# Patient Record
Sex: MALE | Race: OTHER | HISPANIC OR LATINO
[De-identification: names, ages, dates, MRNs, and addresses within clinical notes are randomized per-mention and may not be internally consistent; named-entity substitution may affect disease eponyms.]

---

## 2019-01-29 ENCOUNTER — FORM ENCOUNTER (OUTPATIENT)
Age: 49
End: 2019-01-29

## 2019-01-30 ENCOUNTER — APPOINTMENT (OUTPATIENT)
Dept: OTHER | Facility: CLINIC | Age: 49
End: 2019-01-30
Payer: COMMERCIAL

## 2019-01-30 VITALS
DIASTOLIC BLOOD PRESSURE: 78 MMHG | RESPIRATION RATE: 16 BRPM | OXYGEN SATURATION: 99 % | BODY MASS INDEX: 30.65 KG/M2 | WEIGHT: 173 LBS | SYSTOLIC BLOOD PRESSURE: 123 MMHG | HEART RATE: 89 BPM | HEIGHT: 63 IN

## 2019-01-30 PROCEDURE — 96150: CPT

## 2019-01-30 PROCEDURE — 99396 PREV VISIT EST AGE 40-64: CPT | Mod: 25

## 2019-01-30 PROCEDURE — 94010 BREATHING CAPACITY TEST: CPT

## 2019-01-30 NOTE — DISCUSSION/SUMMARY
[Patient seen for WTC Monitoring ___] : Patient was seen for WTC monitoring [unfilled] [Please See Note in Chart and Documentation in Trial DB] : Please see note in chart and documentation in Trial DB. [FreeTextEntry3] : s. last seen in 2014. patient reports difficulty breathing especially when climbing stairs more than 1 flight. unable to walk even on flat ground for longer distances due to developing tiredness (more than 3-4 blocks) with some difficulty breathing. his symptoms have been worsening for several years. he reports occasional wheezing, difficulty breathing at night, waking up with wheezes and chest tightness in the morning upon waking up that lasts for some 2-3 hours. reports frequent, almost daily, cough, productive of more than 2 oz of sputum per day. no blood in the sputum. he has not seen an md for this. reports difficulty breathing from his right side. reports some heartburn almost on a daily basis, since 2002 as per report. reports snoring and excessive daytime sleepiness. reports fresh blood in the stools on occasions. has noticed loss of weight. \par his breathing problems appeared after his working at the [employer] site, patient states he never saw medical help for this condition. 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 reportedly has had spirometries for his asbestos tests throughout the years. he is currently working. has difficulty wearing his respirator, worsening with the passing of time. \par patient is diabetic on treatment for this with his pmd. reportedly never told his md about his breathing problems. \par long life non smoker. no history of asthma as a child. no history of allergies. no history of asthma in the family. \par o, overweight. tender at all sinuses and also at upper frontal and lateral zygomatic but less severe. turbinates are symmetrical, occluding the lumens in a good 50-60%, not puffy, no secretions. oropharynx is clear. no lymph nodes. lungs are clear to p/a, no wheezing, some decreased expiratory sounds bilateral. cor is rrr no murmurs. abdomen is soft, no masses, some tenderness epigastrium and right upper quadrant with no rebound. no edema. surgical scar lower back. \par nemesio today: fvc 3.31, 85%; fev1 2.26, 74%; ratio 68, 86%; flow volume slightly concave. loss of 226 ml/yr fev1 since 2014 (fev1 3.39 in 2014)\par a. sinusitis and gerd most probably related to wtc exposure. breathing problems more difficult to fully clarify as pt does not state a clear date when these symptoms started but is clear in stating that he did not have them prior to his working at the [employer]. pt works in the asbestos removal trade which could account for his respiratory issues as well. he may also has dina. \par p. will do chest ct instead of cxr to clarify respiratory issues. will refer for colonoscopy as well. will certify once ct result is done. once certified will request sleep study. rtc in some 2 mo to review results and proceed with certifications.

## 2019-01-30 NOTE — HEALTH RISK ASSESSMENT
[Patient reported colonoscopy was normal] : Patient reported colonoscopy was normal [ColonoscopyDate] : 1/2009

## 2019-01-31 LAB
ALBUMIN SERPL ELPH-MCNC: 4.5 G/DL
ALP BLD-CCNC: 80 U/L
ALT SERPL-CCNC: 32 U/L
ANION GAP SERPL CALC-SCNC: 13 MMOL/L
APPEARANCE: CLEAR
AST SERPL-CCNC: 24 U/L
BASOPHILS # BLD AUTO: 0.01 K/UL
BASOPHILS NFR BLD AUTO: 0.2 %
BILIRUB SERPL-MCNC: 0.3 MG/DL
BILIRUBIN URINE: NEGATIVE
BLOOD URINE: NEGATIVE
BUN SERPL-MCNC: 15 MG/DL
CALCIUM SERPL-MCNC: 9.6 MG/DL
CHLORIDE SERPL-SCNC: 98 MMOL/L
CHOLEST SERPL-MCNC: 233 MG/DL
CHOLEST/HDLC SERPL: 4.8 RATIO
CO2 SERPL-SCNC: 27 MMOL/L
COLOR: YELLOW
CREAT SERPL-MCNC: 0.7 MG/DL
EOSINOPHIL # BLD AUTO: 0.24 K/UL
EOSINOPHIL NFR BLD AUTO: 4.7 %
GLUCOSE QUALITATIVE U: 250 MG/DL
GLUCOSE SERPL-MCNC: 249 MG/DL
HCT VFR BLD CALC: 45.7 %
HDLC SERPL-MCNC: 49 MG/DL
HGB BLD-MCNC: 14.9 G/DL
IMM GRANULOCYTES NFR BLD AUTO: 0.2 %
KETONES URINE: NEGATIVE
LDLC SERPL CALC-MCNC: 127 MG/DL
LEUKOCYTE ESTERASE URINE: NEGATIVE
LYMPHOCYTES # BLD AUTO: 1.92 K/UL
LYMPHOCYTES NFR BLD AUTO: 37.6 %
MAN DIFF?: NORMAL
MCHC RBC-ENTMCNC: 29.3 PG
MCHC RBC-ENTMCNC: 32.6 GM/DL
MCV RBC AUTO: 89.8 FL
MONOCYTES # BLD AUTO: 0.41 K/UL
MONOCYTES NFR BLD AUTO: 8 %
NEUTROPHILS # BLD AUTO: 2.51 K/UL
NEUTROPHILS NFR BLD AUTO: 49.3 %
NITRITE URINE: NEGATIVE
PH URINE: 5.5
PLATELET # BLD AUTO: 263 K/UL
POTASSIUM SERPL-SCNC: 4.3 MMOL/L
PROT SERPL-MCNC: 7.3 G/DL
PROTEIN URINE: NEGATIVE MG/DL
RBC # BLD: 5.09 M/UL
RBC # FLD: 12.9 %
SODIUM SERPL-SCNC: 138 MMOL/L
SPECIFIC GRAVITY URINE: 1.02
TRIGL SERPL-MCNC: 285 MG/DL
UROBILINOGEN URINE: NEGATIVE MG/DL
WBC # FLD AUTO: 5.1 K/UL

## 2019-04-11 ENCOUNTER — APPOINTMENT (OUTPATIENT)
Dept: GASTROENTEROLOGY | Facility: CLINIC | Age: 49
End: 2019-04-11

## 2019-04-24 ENCOUNTER — APPOINTMENT (OUTPATIENT)
Dept: OTHER | Facility: CLINIC | Age: 49
End: 2019-04-24

## 2020-01-08 ENCOUNTER — LABORATORY RESULT (OUTPATIENT)
Age: 50
End: 2020-01-08

## 2020-01-08 ENCOUNTER — APPOINTMENT (OUTPATIENT)
Dept: OTHER | Facility: CLINIC | Age: 50
End: 2020-01-08
Payer: COMMERCIAL

## 2020-01-08 VITALS
BODY MASS INDEX: 31.01 KG/M2 | WEIGHT: 175 LBS | TEMPERATURE: 98.8 F | HEART RATE: 91 BPM | HEIGHT: 63 IN | DIASTOLIC BLOOD PRESSURE: 63 MMHG | OXYGEN SATURATION: 96 % | SYSTOLIC BLOOD PRESSURE: 97 MMHG | RESPIRATION RATE: 15 BRPM

## 2020-01-08 PROCEDURE — 99214 OFFICE O/P EST MOD 30 MIN: CPT | Mod: 25

## 2020-01-08 PROCEDURE — 99396 PREV VISIT EST AGE 40-64: CPT | Mod: 25

## 2020-01-08 PROCEDURE — 94010 BREATHING CAPACITY TEST: CPT

## 2020-01-08 RX ORDER — CLINDAMYCIN PHOSPHATE 1 G/10ML
1 GEL TOPICAL
Qty: 60 | Refills: 0 | Status: COMPLETED | COMMUNITY
Start: 2019-07-22

## 2020-01-08 RX ORDER — INSULIN GLARGINE 100 [IU]/ML
100 INJECTION, SOLUTION SUBCUTANEOUS
Qty: 15 | Refills: 0 | Status: COMPLETED | COMMUNITY
Start: 2019-05-15

## 2020-01-08 RX ORDER — LANCETS 33 GAUGE
EACH MISCELLANEOUS
Qty: 200 | Refills: 0 | Status: ACTIVE | COMMUNITY
Start: 2019-05-15

## 2020-01-08 RX ORDER — BLOOD SUGAR DIAGNOSTIC
STRIP MISCELLANEOUS
Qty: 200 | Refills: 0 | Status: COMPLETED | COMMUNITY
Start: 2019-05-15

## 2020-01-08 RX ORDER — DOXYCYCLINE 100 MG/1
100 CAPSULE ORAL
Qty: 14 | Refills: 0 | Status: COMPLETED | COMMUNITY
Start: 2019-07-08

## 2020-01-08 RX ORDER — SIMVASTATIN 20 MG/1
20 TABLET, FILM COATED ORAL
Qty: 90 | Refills: 0 | Status: ACTIVE | COMMUNITY
Start: 2019-10-03

## 2020-01-08 RX ORDER — RAMIPRIL 2.5 MG/1
2.5 CAPSULE ORAL
Qty: 90 | Refills: 0 | Status: ACTIVE | COMMUNITY
Start: 2019-05-15

## 2020-01-08 RX ORDER — INSULIN LISPRO 200 [IU]/ML
200 INJECTION, SOLUTION SUBCUTANEOUS
Qty: 6 | Refills: 0 | Status: COMPLETED | COMMUNITY
Start: 2019-05-15

## 2020-01-08 RX ORDER — TRIAMCINOLONE ACETONIDE 1 MG/G
0.1 CREAM TOPICAL
Qty: 80 | Refills: 0 | Status: COMPLETED | COMMUNITY
Start: 2019-07-22

## 2020-01-08 RX ORDER — SITAGLIPTIN AND METFORMIN HYDROCHLORIDE 50; 1000 MG/1; MG/1
50-1000 TABLET, FILM COATED ORAL
Qty: 180 | Refills: 0 | Status: COMPLETED | COMMUNITY
Start: 2019-10-03

## 2020-01-08 NOTE — ASSESSMENT
[FreeTextEntry1] : siro today: fvc 3.30, 79%; fev1 2.20, 68%; ratio 67, 85%; flow volume is restrictive overall, fet 5 sec. loss of 238ml/yr fev1 since 2014 (fev1 3.39)\par a. symptomatic due to lack of treatment. conditons are chronic and permanent.\par p. will start patient on anoro one puff a day, and famotidine prn for gerd. will evaluate response and decide sleep study based on response. will get chest ct follow up for enlarged lymph nodes. rtc in 4 mo.

## 2020-01-08 NOTE — HISTORY OF PRESENT ILLNESS
[FreeTextEntry1] : certified for sinusitis, gerd and chronic resp condition\par s. last seen a year ago.\par patient reports phlegm when coughing, thick, white, no blood. difficulty breathing. continues with nasal stufiness especially rigth side. no blood. his gerd continues. reports excessive daytime sleepiness and tiredness. \par patient has dm on insulin and metformin, ramipril and simvastatin. \par he continues working. has difficulty tolerating his respiratory protective equipment. \par \par

## 2020-01-08 NOTE — PHYSICAL EXAM
[General Appearance - Alert] : alert [General Appearance - In No Acute Distress] : in no acute distress [General Appearance - Well Nourished] : well nourished [General Appearance - Well Developed] : well developed [Sclera] : the sclera and conjunctiva were normal [Extraocular Movements] : extraocular movements were intact [PERRL With Normal Accommodation] : pupils were equal in size, round, and reactive to light [Outer Ear] : the ears and nose were normal in appearance [Neck Appearance] : the appearance of the neck was normal [Neck Cervical Mass (___cm)] : no neck mass was observed [Jugular Venous Distention Increased] : there was no jugular-venous distention [Thyroid Diffuse Enlargement] : the thyroid was not enlarged [Exaggerated Use Of Accessory Muscles For Inspiration] : no accessory muscle use [Respiration, Rhythm And Depth] : normal respiratory rhythm and effort [Lungs Percussion] : the lungs were normal to percussion [FreeTextEntry1] : expiratlory wheezes atneriorly left upper lobe when lying down [Apical Impulse] : the apical impulse was normal [Heart Rate And Rhythm] : heart rate was normal and rhythm regular [Heart Sounds] : normal S1 and S2 [Heart Sounds Gallop] : no gallops [Murmurs] : no murmurs [Edema] : there was no peripheral edema [Bowel Sounds] : normal bowel sounds [Abdomen Soft] : soft [Abdomen Tenderness] : non-tender [] : no hepato-splenomegaly [Abdomen Mass (___ Cm)] : no abdominal mass palpated [Cervical Lymph Nodes Enlarged Posterior Bilaterally] : posterior cervical [Cervical Lymph Nodes Enlarged Anterior Bilaterally] : anterior cervical [Supraclavicular Lymph Nodes Enlarged Bilaterally] : supraclavicular [Axillary Lymph Nodes Enlarged Bilaterally] : axillary

## 2020-01-08 NOTE — DISCUSSION/SUMMARY
[Patient seen for WTC Monitoring ___] : Patient was seen for WTC monitoring [unfilled] [Please See Note in Chart and Documentation in Trial DB] : Please see note in chart and documentation in Trial DB. [FreeTextEntry3] : certified for sinusitis, gerd and chronic resp condition\par s. last seen a year ago.\par patient reports phlegm when coughing, thick, white, no blood. difficulty breathing. continues with nasal stufiness especially rigth side. no blood. his gerd continues. reports excessive daytime sleepiness and tiredness. \par patient has dm on insulin and metformin, ramipril and simvastatin. \par he continues working. has difficulty tolerating his respiratory protective equipment. \par Constitutional: alert, in no acute distress, well nourished and well developed . overweight. \par Eyes: the sclera and conjunctiva were normal, pupils were equal in size, round, and reactive to light and extraocular movements were intact. \par ENT: the ears and nose were normal in appearance. Puffty turbinates, symmetrically occluding the lumens in a good 60% or so, myucosa reddish, no tender at sinues. oropharynx overall clear. \par Neck: the appearance of the neck was normal, the neck was supple, no neck mass was observed and the thyroid was not enlarged . there was no jugular-venous distention. \par Pulmonary: no respiratory distress, normal respiratory rhythm and effort and no accessory muscle use . the lungs were normal to percussion. expiratlory wheezes atneriorly left upper lobe when lying down. \par Heart: the apical impulse was normal, heart rate was normal and rhythm regular, normal S1 and S2, no gallops and no murmurs. \par Vascular:. there was no peripheral edema. \par Abdomen: normal bowel sounds, soft, non-tender, no hepato-splenomegaly and no abdominal mass palpated. \par Lymphatics: The posterior cervical, anterior cervical, supraclavicular and axillary nodes were non-tender and normal size.\par a and p. documentation compelted.

## 2020-01-08 NOTE — PHYSICAL EXAM
[General Appearance - Alert] : alert [General Appearance - In No Acute Distress] : in no acute distress [General Appearance - Well Nourished] : well nourished [General Appearance - Well Developed] : well developed [Sclera] : the sclera and conjunctiva were normal [PERRL With Normal Accommodation] : pupils were equal in size, round, and reactive to light [Extraocular Movements] : extraocular movements were intact [Outer Ear] : the ears and nose were normal in appearance [Neck Appearance] : the appearance of the neck was normal [Neck Cervical Mass (___cm)] : no neck mass was observed [Jugular Venous Distention Increased] : there was no jugular-venous distention [Thyroid Diffuse Enlargement] : the thyroid was not enlarged [Respiration, Rhythm And Depth] : normal respiratory rhythm and effort [Exaggerated Use Of Accessory Muscles For Inspiration] : no accessory muscle use [Lungs Percussion] : the lungs were normal to percussion [FreeTextEntry1] : expiratlory wheezes atneriorly left upper lobe when lying down [Apical Impulse] : the apical impulse was normal [Heart Rate And Rhythm] : heart rate was normal and rhythm regular [Heart Sounds] : normal S1 and S2 [Murmurs] : no murmurs [Heart Sounds Gallop] : no gallops [Edema] : there was no peripheral edema [Bowel Sounds] : normal bowel sounds [Abdomen Tenderness] : non-tender [Abdomen Soft] : soft [Abdomen Mass (___ Cm)] : no abdominal mass palpated [] : no hepato-splenomegaly [Cervical Lymph Nodes Enlarged Posterior Bilaterally] : posterior cervical [Cervical Lymph Nodes Enlarged Anterior Bilaterally] : anterior cervical [Supraclavicular Lymph Nodes Enlarged Bilaterally] : supraclavicular [Axillary Lymph Nodes Enlarged Bilaterally] : axillary

## 2020-01-09 LAB
ALBUMIN SERPL ELPH-MCNC: 4.3 G/DL
ALP BLD-CCNC: 177 U/L
ALT SERPL-CCNC: 36 U/L
ANION GAP SERPL CALC-SCNC: 16 MMOL/L
APPEARANCE: ABNORMAL
AST SERPL-CCNC: 21 U/L
BASOPHILS # BLD AUTO: 0.03 K/UL
BASOPHILS NFR BLD AUTO: 0.4 %
BILIRUB SERPL-MCNC: 0.3 MG/DL
BILIRUBIN URINE: NEGATIVE
BLOOD URINE: ABNORMAL
BUN SERPL-MCNC: 18 MG/DL
CALCIUM SERPL-MCNC: 9.6 MG/DL
CHLORIDE SERPL-SCNC: 97 MMOL/L
CHOLEST SERPL-MCNC: 147 MG/DL
CHOLEST/HDLC SERPL: 3.3 RATIO
CO2 SERPL-SCNC: 26 MMOL/L
COLOR: YELLOW
CREAT SERPL-MCNC: 0.98 MG/DL
EOSINOPHIL # BLD AUTO: 0.15 K/UL
EOSINOPHIL NFR BLD AUTO: 1.9 %
GLUCOSE QUALITATIVE U: NEGATIVE
GLUCOSE SERPL-MCNC: 176 MG/DL
HCT VFR BLD CALC: 41.1 %
HDLC SERPL-MCNC: 45 MG/DL
HGB BLD-MCNC: 12.7 G/DL
IMM GRANULOCYTES NFR BLD AUTO: 0.4 %
KETONES URINE: NEGATIVE
LDLC SERPL CALC-MCNC: 81 MG/DL
LEUKOCYTE ESTERASE URINE: ABNORMAL
LYMPHOCYTES # BLD AUTO: 1.85 K/UL
LYMPHOCYTES NFR BLD AUTO: 23.1 %
MAN DIFF?: NORMAL
MCHC RBC-ENTMCNC: 29.3 PG
MCHC RBC-ENTMCNC: 30.9 GM/DL
MCV RBC AUTO: 94.9 FL
MONOCYTES # BLD AUTO: 0.64 K/UL
MONOCYTES NFR BLD AUTO: 8 %
NEUTROPHILS # BLD AUTO: 5.31 K/UL
NEUTROPHILS NFR BLD AUTO: 66.2 %
NITRITE URINE: POSITIVE
PH URINE: 6
PLATELET # BLD AUTO: 414 K/UL
POTASSIUM SERPL-SCNC: 4.2 MMOL/L
PROT SERPL-MCNC: 7.8 G/DL
PROTEIN URINE: ABNORMAL
RBC # BLD: 4.33 M/UL
RBC # FLD: 13.2 %
SODIUM SERPL-SCNC: 139 MMOL/L
SPECIFIC GRAVITY URINE: 1.02
TRIGL SERPL-MCNC: 106 MG/DL
UROBILINOGEN URINE: NORMAL
WBC # FLD AUTO: 8.01 K/UL

## 2020-03-16 ENCOUNTER — APPOINTMENT (OUTPATIENT)
Dept: GASTROENTEROLOGY | Facility: CLINIC | Age: 50
End: 2020-03-16

## 2020-05-27 ENCOUNTER — APPOINTMENT (OUTPATIENT)
Dept: OTHER | Facility: CLINIC | Age: 50
End: 2020-05-27
Payer: COMMERCIAL

## 2020-05-27 ENCOUNTER — APPOINTMENT (OUTPATIENT)
Dept: OTHER | Facility: CLINIC | Age: 50
End: 2020-05-27

## 2020-05-27 PROCEDURE — 99441: CPT | Mod: 95

## 2020-05-27 NOTE — HISTORY OF PRESENT ILLNESS
[Medical Office: ([facility])___] : at the medical office located in  [Home] : at home, [unfilled] , at the time of the visit. [Verbal consent obtained from patient] : the patient, [unfilled] [FreeTextEntry1] : telephonic visit at the time of the covid 19 epidemic\par certified for sinusitis, gerd and chronic resp condition\par s. patient reports abdominal pain, anorexia, malaise and nausea for the past some 5 days. he reports no fever but states that is taking tylenol for pain after which he develops profuse sweating. he had constipation the first three days but now his intestinal habit is normal. patient has not been able to contact his pmd about this.\par patient continues with cough and sob. his symptoms improved somehow with anoro but this is now finished and he never picked up the refill, as the pharmacy charged him copayment. \par his gerd is stable.\par patient has dm on insulin and metformin, ramipril and simvastatin. \par he has been out of work\par o. on telephone patient sounded weak and in pain\par p. i urged the patient to attend an urgent  care as soon as possible. i explained that this does not seem to be covid 19 but that it could be osme type of intestinal problem that most probably will require furtehr testing and may require additoinal treatment. pt understood. as for his meds, will sign him into long term pharmacy and submit meds to this service. will follow up with him tomorrow about his urgent care visit. next visit will discuss sleep study. explained about ct results. reassurance and support.\par i spent some 9 min on the phone with pt.

## 2020-06-10 ENCOUNTER — LABORATORY RESULT (OUTPATIENT)
Age: 50
End: 2020-06-10

## 2020-06-10 ENCOUNTER — APPOINTMENT (OUTPATIENT)
Dept: GASTROENTEROLOGY | Facility: CLINIC | Age: 50
End: 2020-06-10
Payer: COMMERCIAL

## 2020-06-10 ENCOUNTER — APPOINTMENT (OUTPATIENT)
Dept: OTHER | Facility: CLINIC | Age: 50
End: 2020-06-10

## 2020-06-10 VITALS
BODY MASS INDEX: 31.36 KG/M2 | SYSTOLIC BLOOD PRESSURE: 117 MMHG | WEIGHT: 177 LBS | TEMPERATURE: 97.4 F | HEIGHT: 63 IN | HEART RATE: 90 BPM | DIASTOLIC BLOOD PRESSURE: 80 MMHG | OXYGEN SATURATION: 98 %

## 2020-06-10 DIAGNOSIS — Z11.59 ENCOUNTER FOR SCREENING FOR OTHER VIRAL DISEASES: ICD-10-CM

## 2020-06-10 DIAGNOSIS — R19.8 OTHER SPECIFIED SYMPTOMS AND SIGNS INVOLVING THE DIGESTIVE SYSTEM AND ABDOMEN: ICD-10-CM

## 2020-06-10 DIAGNOSIS — R07.89 OTHER CHEST PAIN: ICD-10-CM

## 2020-06-10 DIAGNOSIS — R11.2 NAUSEA WITH VOMITING, UNSPECIFIED: ICD-10-CM

## 2020-06-10 PROCEDURE — 99204 OFFICE O/P NEW MOD 45 MIN: CPT

## 2020-06-10 RX ORDER — POLYETHYLENE GLYCOL 3350, SODIUM CHLORIDE, SODIUM BICARBONATE AND POTASSIUM CHLORIDE WITH LEMON FLAVOR 420; 11.2; 5.72; 1.48 G/4L; G/4L; G/4L; G/4L
420 POWDER, FOR SOLUTION ORAL
Qty: 1 | Refills: 0 | Status: ACTIVE | COMMUNITY
Start: 2020-06-10 | End: 1900-01-01

## 2020-06-10 RX ORDER — PROCHLORPERAZINE MALEATE 10 MG/1
10 TABLET ORAL
Refills: 0 | Status: ACTIVE | COMMUNITY

## 2020-06-10 NOTE — HISTORY OF PRESENT ILLNESS
[None] : had no significant interval events [Yellow Skin Or Eyes (Jaundice)] : denies jaundice [Rectal Pain] : denies rectal pain [Heartburn] : heartburn [Nausea] : nausea [Diarrhea] : diarrhea [Vomiting] : vomiting [Abdominal Pain] : abdominal pain [Constipation] : constipation [GERD] : gastroesophageal reflux disease [Abdominal Swelling] : abdominal swelling [Wt Gain ___ Lbs] : no recent weight gain [Wt Loss ___ Lbs] : no recent weight loss [Hiatus Hernia] : no hiatus hernia [Peptic Ulcer Disease] : no peptic ulcer disease [Pancreatitis] : no pancreatitis [Cholelithiasis] : no cholelithiasis [Kidney Stone] : no kidney stone [Inflammatory Bowel Disease] : no inflammatory bowel disease [Irritable Bowel Syndrome] : no irritable bowel syndrome [Alcohol Abuse] : no alcohol abuse [Diverticulitis] : no diverticulitis [Malignancy] : no malignancy [Abdominal Surgery] : no abdominal surgery [Appendectomy] : no appendectomy [Cholecystectomy] : no cholecystectomy [de-identified] : The patient is a 50-year-old  male with past medical history significant for hypertension, hypercholesterolemia and diabetes mellitus who was referred to my office by Dr. Marty Robles for abdominal pain, dyspepsia, gastroesophageal reflux disease, atypical chest pain and nausea/vomiting. The patient also admits to having alternating diarrhea/constipation, change in bowel habits and change in caliber of stool. I was asked to render an opinion for consultation for the above complaints.   The patient states that he is feeling uncomfortable x 3 weeks. The patient denies any exposure to COVID 19 infection.  The patient admits to a recent viral illness that resolved spontaneously.   The patient complains of abdominal pain.  The patient describes the abdominal pain as a crampy, intermittent c diffuse abdominal discomfort that occasionally radiates to the back.  The abdominal pain is worse with stress.  The abdominal pain is worse with meals and improves with passing gas or having a bowel movement.  The abdominal pain is described as being mild to moderate in nature.  The abdominal pain occurs at night and in the morning.  The abdominal pain can occur at any time.   The abdominal pain has awakened the patient from sleep.  The abdominal pain is slightly relieved with certain medication such as pump inhibitors, H2 blockers and antacids.  The abdominal pain is associated with abdominal gas and bloating.  The patient complains of nausea and vomiting.  The patient complains of gastroesophageal reflux disease but denies any dysphagia. The gastroesophageal reflux disease is worse after meals and late at night and in the early morning. The gastroesophageal reflux disease is slightly improved with proton pump inhibitors, H2 blockers and antacids.  The patient complains of atypical chest pain, shortness of breath, cough and palpitations that resolved spontaneously.  He currently denies any atypical chest pain, shortness of breath or palpitations.  The chest pain is described as a pressure, intermittent substernal discomfort that occasionally radiates to the back.  The patient admits to occasional episodes of diaphoresis.  The chest pain is described as being 8 out of 10 in intensity.  The chest pain can occur at any time.  The chest pain is worse at night and early morning.  The chest pain is worse after meals and improves with passing gas.  The chest pain never awakened the patient from sleep.  The patient complains of alternating diarrhea/constipation.  The patient has 1 to 3 bowel movements a day.  The patient complains of a change in bowel habits.  The patient complains of a change in caliber of stool.   The diarrhea is described as soft to watery in nature.  The patient admits to having mucus discharge with the bowel movements.  The patient denies any bright red blood per rectum, melena or hematemesis.  The patient denies any rectal pain or rectal pruritus. The patient complains of anorexia but denies any weight loss.  He previously complained of fever and chills that resolved spontaneously.  He currently denies any fevers or chills.  The patient denies any jaundice or pruritus.  The patient complains of occasional lower back pain.  The patient admits to having a prior ?colonoscopy performed by another gastroenterologist over 10 years ago.  According to the patient, the colonoscopic findings were unknown to the patient.  The patient admits to a family history of GI problems.  The patient’s sister had a history of liver cancer.

## 2020-06-10 NOTE — ASSESSMENT
[FreeTextEntry1] : Abdominal Pain: The patient complains of abdominal pain. The patient is to avoid nonsteroidal anti-inflammatory drugs and aspirin. I recommend a trial of Pantoprazole 40 mg once a day for 3 months for the symptoms.  \par Dyspepsia: The patient complains of dyspeptic symptoms.  The patient was advised to abide by an anti-gas diet.  The patient was given a pamphlet for anti-gas.  The patient and I reviewed the anti-gas diet at length. The patient is to start on a trial of Phazyme one tablet 3 times a day p.r.n. abdominal pain and gas.\par GERD: The patient was advised to avoid late-night meals and dietary indiscretions.  The patient was advised to avoid fried and fatty foods.  The patient was advised to abide by an anti-GERD diet. The patient was given a pamphlet for anti-GERD.  The patient and I reviewed the anti-GERD diet at length. I recommend a trial of Pantoprazole 40 mg once a day x 3 months for the symptoms.\par Nausea/Vomiting: The patient complains of nausea/vomiting. If the symptoms of nausea/vomiting persists, the patient may require a trial of Zofran 4 mg twice a day. \par Alternating Diarrhea/Constipation: The patient complains of alternating diarrhea/constipation.  I recommend a low residue diet. The patient is to avoid fiber supplementation. The patient is to consider starting a trial of a probiotic such as Align once a day.   If the symptons persist, the patient may require sending stool studies for C+S, O+P x3, and C. difficile to assess for an infectious etiology of the diarrhea.  The symptoms are worse after meals.  The patient has a history of cholecystectomy.  I recommend a trial of cholestyramine one packet once a day for possible bile induced diarrhea. \par Atypical Chest Pain: The patient complains of atypical chest pain of unclear etiology.  The patient was advised to follow up with the PMD and cardiologist regarding evaluation for the atypical chest pain. The patient was told of possible etiologies such as cardiac, pulmonary, GI, musculoskeletal, stress and other causes for the atypical chest pain.  The patient agrees and will follow-up with the PMD and cardiologist. \par Upper Endoscopy and Colonoscopy: I recommend an upper endoscopy and colonoscopy to assess the symptoms.  The patient was told of the risks and benefits of the procedure.  The patient was told of the risks of perforation, emergency surgery, bleeding, infections and missed lesions.  The patient agreed and will schedule for the procedure. The patient can take the antihypertensive medication with a sip of water one hour prior to the procedure. The patient is to hold the diabetic medication the day before and the morning of the procedure. The patient is to be n.p.o. after midnight and bowel prep was given.  The patient is to return for the procedure. \par Blood Work: I recommend blood work to assess the patient's symptoms. I recommend a CBC, SMA 24, amylase, lipase, ESR, TFTs, ROMAN, rheumatoid factor, celiac sprue panel, IgA, profile for hepatitis A, B, C. ,iron, TIBC, ferritin level and lipid profile.  I also recommend obtaining the recent blood work performed by the patient's PMD.\par I recommend blood work to assess for COVID 19 IgG antibodies.\par \par \par \par \par \par \par \par

## 2020-06-10 NOTE — REVIEW OF SYSTEMS
[Feeling Tired] : feeling tired [Eyesight Problems] : eyesight problems [Nasal Discharge] : nasal discharge [Earache] : earache [Chest Pain] : chest pain [Sore Throat] : sore throat [Abdominal Pain] : abdominal pain [Palpitations] : palpitations [Shortness Of Breath] : shortness of breath [Diarrhea] : diarrhea [Constipation] : constipation [Nocturia] : nocturia [Heartburn] : heartburn [Joint Pain] : joint pain [Arthralgias] : arthralgias [Dry Skin] : dry skin [Dizziness] : dizziness [Depression] : depression [Anxiety] : anxiety [Negative] : Heme/Lymph [de-identified] : tremors, headaches

## 2020-06-15 DIAGNOSIS — Z01.818 ENCOUNTER FOR OTHER PREPROCEDURAL EXAMINATION: ICD-10-CM

## 2020-06-15 LAB
ALBUMIN SERPL ELPH-MCNC: 4 G/DL
ALP BLD-CCNC: 360 U/L
ALT SERPL-CCNC: 63 U/L
AMYLASE/CREAT SERPL: 78 U/L
ANION GAP SERPL CALC-SCNC: 18 MMOL/L
AST SERPL-CCNC: 41 U/L
BASOPHILS # BLD AUTO: 0.03 K/UL
BASOPHILS NFR BLD AUTO: 0.4 %
BILIRUB SERPL-MCNC: 0.2 MG/DL
BUN SERPL-MCNC: 20 MG/DL
CALCIUM SERPL-MCNC: 10.2 MG/DL
CHLORIDE SERPL-SCNC: 99 MMOL/L
CO2 SERPL-SCNC: 22 MMOL/L
CREAT SERPL-MCNC: 1.32 MG/DL
EOSINOPHIL # BLD AUTO: 0.12 K/UL
EOSINOPHIL NFR BLD AUTO: 1.8 %
ERYTHROCYTE [SEDIMENTATION RATE] IN BLOOD BY WESTERGREN METHOD: 120 MM/HR
FERRITIN SERPL-MCNC: 791 NG/ML
GGT SERPL-CCNC: 184 U/L
GLIADIN IGA SER QL: 5 UNITS
GLIADIN IGG SER QL: <5 UNITS
GLIADIN PEPTIDE IGA SER-ACNC: NEGATIVE
GLIADIN PEPTIDE IGG SER-ACNC: NEGATIVE
GLUCOSE SERPL-MCNC: 111 MG/DL
HBV CORE IGG+IGM SER QL: NONREACTIVE
HBV CORE IGM SER QL: NONREACTIVE
HBV E AB SER QL: NEGATIVE
HBV E AG SER QL: NEGATIVE
HBV SURFACE AB SER QL: ABNORMAL
HBV SURFACE AG SER QL: NONREACTIVE
HCT VFR BLD CALC: 37.5 %
HCV AB SER QL: NONREACTIVE
HCV S/CO RATIO: 0.17 S/CO
HEMOCCULT STL QL: NEGATIVE
HGB BLD-MCNC: 11.1 G/DL
IGA SER QL IEP: 469 MG/DL
IMM GRANULOCYTES NFR BLD AUTO: 0.3 %
IRON SATN MFR SERPL: 20 %
IRON SERPL-MCNC: 42 UG/DL
LPL SERPL-CCNC: 44 U/L
LYMPHOCYTES # BLD AUTO: 1.71 K/UL
LYMPHOCYTES NFR BLD AUTO: 25.4 %
MAN DIFF?: NORMAL
MCHC RBC-ENTMCNC: 27.5 PG
MCHC RBC-ENTMCNC: 29.6 GM/DL
MCV RBC AUTO: 92.8 FL
MONOCYTES # BLD AUTO: 0.4 K/UL
MONOCYTES NFR BLD AUTO: 6 %
NEUTROPHILS # BLD AUTO: 4.44 K/UL
NEUTROPHILS NFR BLD AUTO: 66.1 %
PLATELET # BLD AUTO: 438 K/UL
POTASSIUM SERPL-SCNC: 5.2 MMOL/L
PROT SERPL-MCNC: 8.3 G/DL
RBC # BLD: 4.04 M/UL
RBC # FLD: 14 %
RHEUMATOID FACT SER QL: <10 IU/ML
SARS-COV-2 IGG SERPL IA-ACNC: <3.8 AU/ML
SARS-COV-2 IGG SERPL QL IA: NEGATIVE
SODIUM SERPL-SCNC: 139 MMOL/L
TIBC SERPL-MCNC: 211 UG/DL
TM INTERPRETATION: NORMAL
TSH SERPL-ACNC: 3.59 UIU/ML
TTG IGA SER IA-ACNC: <1.2 U/ML
TTG IGA SER-ACNC: NEGATIVE
TTG IGG SER IA-ACNC: 1.8 U/ML
TTG IGG SER IA-ACNC: NEGATIVE
UIBC SERPL-MCNC: 168 UG/DL
WBC # FLD AUTO: 6.72 K/UL

## 2020-06-16 ENCOUNTER — APPOINTMENT (OUTPATIENT)
Dept: DISASTER EMERGENCY | Facility: CLINIC | Age: 50
End: 2020-06-16

## 2020-06-17 ENCOUNTER — APPOINTMENT (OUTPATIENT)
Dept: DISASTER EMERGENCY | Facility: CLINIC | Age: 50
End: 2020-06-17

## 2020-06-18 ENCOUNTER — OUTPATIENT (OUTPATIENT)
Dept: OUTPATIENT SERVICES | Facility: HOSPITAL | Age: 50
LOS: 1 days | End: 2020-06-18
Payer: COMMERCIAL

## 2020-06-18 ENCOUNTER — RESULT REVIEW (OUTPATIENT)
Age: 50
End: 2020-06-18

## 2020-06-18 ENCOUNTER — APPOINTMENT (OUTPATIENT)
Dept: GASTROENTEROLOGY | Facility: HOSPITAL | Age: 50
End: 2020-06-18

## 2020-06-18 DIAGNOSIS — K21.9 GASTRO-ESOPHAGEAL REFLUX DISEASE WITHOUT ESOPHAGITIS: ICD-10-CM

## 2020-06-18 DIAGNOSIS — R10.84 GENERALIZED ABDOMINAL PAIN: ICD-10-CM

## 2020-06-18 DIAGNOSIS — R19.8 OTHER SPECIFIED SYMPTOMS AND SIGNS INVOLVING THE DIGESTIVE SYSTEM AND ABDOMEN: ICD-10-CM

## 2020-06-18 LAB — SARS-COV-2 N GENE NPH QL NAA+PROBE: NOT DETECTED

## 2020-06-18 PROCEDURE — 88312 SPECIAL STAINS GROUP 1: CPT | Mod: 26

## 2020-06-18 PROCEDURE — 88305 TISSUE EXAM BY PATHOLOGIST: CPT | Mod: 26

## 2020-06-18 PROCEDURE — 88305 TISSUE EXAM BY PATHOLOGIST: CPT

## 2020-06-18 PROCEDURE — 88312 SPECIAL STAINS GROUP 1: CPT

## 2020-06-18 PROCEDURE — 43239 EGD BIOPSY SINGLE/MULTIPLE: CPT

## 2020-06-18 NOTE — CHART NOTE - NSCHARTNOTEFT_GEN_A_CORE
Esophagogastroduodenoscopy Report:    Indication:          abdominal pain, dyspepsia, GERD, atypical chest pain, nausea/vomiting, diarrhea  Referring MD:     Dr. Sameer Walker  Instrument:  #     # [id number]  Anesthesia:         MAC    Consent:  Informed consent was obtained from the patient after providing any opportunity for questions  Procedure: The gastroscope was gently passed through the incisoral orifice into the oral cavity and under direct visualization the esophagus was intubated. The endoscope was passed down the esophagus, through the stomach and into proximal jejunum. Color, texture, mucosa and anatomy of the esophagus, stomach, and duodenum were carefully examined with the scope. The patient tolerated the procedure well. After completion of the examination, the patient was transferred to the recovery room.     Preparation: NPO     Findings:     Oropharynx	Normal appearing oropharynx.  Esophagus	Esophagus with mild distal erythema but no ulcers or erosions noted. Biopsy taken.  EG-junction	Normal appearing z-line at 39 cm. No hiatal hernia.  No ulcer, erosions or erythema noted.  Cardia	              Mild diffuse erythema suggestive of gastritis but no ulcers or erosions noted. Bile suctioned.  Body	              Mild diffuse erythema suggestive of gastritis but no ulcers or erosions noted. Biopsy taken.  Antrum	              Mild diffuse erythema suggestive of gastritis but no ulcers or erosions noted. Biopsy taken.  Pylorus	              Normal appearing pylorus.  Duodenal Bulb	Normal small bowel mucosa with no ulcers, erosions or erythema noted. Biopsy taken.  2nd portion           Normal small bowel mucosa with no ulcers, erosions or erythema noted. Biopsy taken.  3rd portion	Not visualized.      EBL:0    Impression: Mild diffuse gastritis    Plan:     1. Avoid late-night meals and dietary indiscretions.  2. Avoid fried and fatty foods.  3. Avoid nonsteroidal anti-inflammatory drugs and aspirin.  4. Will check path results.  5. Recommend a trial of pantoprazole 40 mg once a day for 3 months.  6. Followup in office in 4 weeks to reassess the symptoms and discuss the findings.                                  Procedure Start Time: 1:57 pm  Procedure End Time:   2:04 pm                                  Attending:       Jaciel Dorantes M.D.

## 2020-06-22 LAB — SURGICAL PATHOLOGY STUDY: SIGNIFICANT CHANGE UP

## 2020-06-22 RX ORDER — FAMOTIDINE 40 MG/1
40 TABLET, FILM COATED ORAL
Qty: 90 | Refills: 1 | Status: ACTIVE | COMMUNITY
Start: 2020-01-08 | End: 1900-01-01

## 2020-06-24 ENCOUNTER — APPOINTMENT (OUTPATIENT)
Dept: OTHER | Facility: CLINIC | Age: 50
End: 2020-06-24
Payer: COMMERCIAL

## 2020-06-24 DIAGNOSIS — J32.9 CHRONIC SINUSITIS, UNSPECIFIED: ICD-10-CM

## 2020-06-24 DIAGNOSIS — K21.9 GASTRO-ESOPHAGEAL REFLUX DISEASE W/OUT ESOPHAGITIS: ICD-10-CM

## 2020-06-24 DIAGNOSIS — Z04.9 ENCOUNTER FOR EXAMINATION AND OBSERVATION FOR UNSPECIFIED REASON: ICD-10-CM

## 2020-06-24 DIAGNOSIS — J68.4 CHRONIC RESPIRATORY CONDITIONS DUE TO CHEMICALS, GASES, FUMES AND VAPORS: ICD-10-CM

## 2020-06-24 PROCEDURE — 99441: CPT | Mod: 95

## 2020-06-24 RX ORDER — OMEPRAZOLE 20 MG/1
20 TABLET, DELAYED RELEASE ORAL
Refills: 0 | Status: COMPLETED | COMMUNITY
End: 2020-06-24

## 2020-06-24 NOTE — HISTORY OF PRESENT ILLNESS
[Home] : at home, [unfilled] , at the time of the visit. [Medical Office: ([facility])___] : at the medical office located in  [FreeTextEntry1] : telephonic visit at the time of the covid 19 epidemic\par certified for sinusitis, gerd and chronic resp condition\par s. patient reports that the abdominal pain, malaise and nausea have improved. he was given some pain meds by his pmd and hten was seen by gi md. he underwent gi endoscopy llst week and completed an abdominal ct today. \par continues witih some cough and sob. has not got his meds from the mail service yet. \par patient has dm on insulin and metformin, ramipril and simvastatin. \par he has been out of work\par o. on telephone patient sounded overall better as compared to the previous call. he did not seem to be in difficulty breathing. \par dr sexton's note, gi, appreciated. very abnormal liver tests, anemia, very elevated sed rate, negative covid antibody test. endoscopy showed gastritis and pathology showed h pylori. \par a. overall stable. elevated sed rate suggests malignancy\par p. pending for dr sexton's eval, will defer prescription for h pylori to gi md. will review abdomen ct as soon as availabe. pt had a chest ct done earlier this year which did not show major thoracic pathology. there were prominent lymph nodes but these were thought to be reactive. \par i spent some 5 min on the phone with pt.

## 2020-06-26 RX ORDER — BUDESONIDE AND FORMOTEROL FUMARATE DIHYDRATE 160; 4.5 UG/1; UG/1
160-4.5 AEROSOL RESPIRATORY (INHALATION) TWICE DAILY
Qty: 1 | Refills: 6 | Status: ACTIVE | COMMUNITY
Start: 2020-06-26 | End: 1900-01-01

## 2020-06-26 RX ORDER — UMECLIDINIUM BROMIDE AND VILANTEROL TRIFENATATE 62.5; 25 UG/1; UG/1
62.5-25 POWDER RESPIRATORY (INHALATION) DAILY
Qty: 3 | Refills: 1 | Status: COMPLETED | COMMUNITY
Start: 2020-01-08 | End: 2020-06-26

## 2020-06-28 ENCOUNTER — FORM ENCOUNTER (OUTPATIENT)
Age: 50
End: 2020-06-28

## 2020-07-01 ENCOUNTER — LABORATORY RESULT (OUTPATIENT)
Age: 50
End: 2020-07-01

## 2020-07-01 ENCOUNTER — APPOINTMENT (OUTPATIENT)
Dept: GASTROENTEROLOGY | Facility: CLINIC | Age: 50
End: 2020-07-01
Payer: COMMERCIAL

## 2020-07-01 VITALS
TEMPERATURE: 98 F | WEIGHT: 178 LBS | SYSTOLIC BLOOD PRESSURE: 121 MMHG | HEIGHT: 63 IN | HEART RATE: 99 BPM | OXYGEN SATURATION: 98 % | DIASTOLIC BLOOD PRESSURE: 87 MMHG | BODY MASS INDEX: 31.54 KG/M2

## 2020-07-01 DIAGNOSIS — K29.30 CHRONIC SUPERFICIAL GASTRITIS W/OUT BLEEDING: ICD-10-CM

## 2020-07-01 DIAGNOSIS — K59.09 OTHER CONSTIPATION: ICD-10-CM

## 2020-07-01 DIAGNOSIS — R74.8 ABNORMAL LEVELS OF OTHER SERUM ENZYMES: ICD-10-CM

## 2020-07-01 DIAGNOSIS — A04.8 OTHER SPECIFIED BACTERIAL INTESTINAL INFECTIONS: ICD-10-CM

## 2020-07-01 DIAGNOSIS — R93.89 ABNORMAL FINDINGS ON DIAGNOSTIC IMAGING OF OTHER SPECIFIED BODY STRUCTURES: ICD-10-CM

## 2020-07-01 DIAGNOSIS — R10.84 GENERALIZED ABDOMINAL PAIN: ICD-10-CM

## 2020-07-01 PROCEDURE — 99214 OFFICE O/P EST MOD 30 MIN: CPT

## 2020-07-01 NOTE — HISTORY OF PRESENT ILLNESS
[None] : had no significant interval events [Vomiting] : denies vomiting [Yellow Skin Or Eyes (Jaundice)] : denies jaundice [Rectal Pain] : denies rectal pain [Diarrhea] : denies diarrhea [Heartburn] : heartburn [Nausea] : nausea [Abdominal Pain] : abdominal pain [Constipation] : constipation [Abdominal Swelling] : abdominal swelling [GERD] : gastroesophageal reflux disease [Wt Gain ___ Lbs] : no recent weight gain [Wt Loss ___ Lbs] : no recent weight loss [Peptic Ulcer Disease] : no peptic ulcer disease [Hiatus Hernia] : no hiatus hernia [Cholelithiasis] : no cholelithiasis [Pancreatitis] : no pancreatitis [Kidney Stone] : no kidney stone [Inflammatory Bowel Disease] : no inflammatory bowel disease [Irritable Bowel Syndrome] : no irritable bowel syndrome [Diverticulitis] : no diverticulitis [Alcohol Abuse] : no alcohol abuse [Malignancy] : no malignancy [Abdominal Surgery] : no abdominal surgery [Appendectomy] : no appendectomy [Cholecystectomy] : no cholecystectomy [de-identified] : The patient states that he is feeling the same.   The patient denies any exposure to COVID 19 infection.  The patient denies any recent viral illness.  The patient denies any jaundice or pruritus.  The patient complains of occasional lower back pain.  The patient complains of nocturia but denies any polyuria, dysuria, hematuria, incontinence or urgency.  The patient is scheduled to see a urologist for the abnormal CAT scan. The patient complains of abdominal pain.  The patient describes the abdominal pain as a crampy, intermittent diffuse abdominal discomfort that occasionally radiates to the back.  The abdominal pain is unrelated to passing gas or having bowel movements.  The abdominal pain is worse with meals.  The abdominal pain is described as being mild in nature.  The abdominal pain occurs at night and in the morning.  The abdominal pain can occur at any time.   The abdominal pain has awakened the patient from sleep.  The abdominal pain is slightly relieved with certain medication such as proton pump inhibitors, H2 blockers and antacids.  The abdominal pain is associated with abdominal gas and bloating.  The patient complains of nausea and cough but denies any vomiting.  The patient complains of occasional gastroesophageal reflux disease and dysphagia.  The dysphagia is worse with solids but unrelated to liquids. The gastroesophageal reflux disease is worse after meals and late at night and in the early morning. The gastroesophageal reflux disease is slightly improved with proton pump inhibitors, H2 blockers and antacids.   The patient complains of atypical chest pain and shortness of breath but denies any palpitations.  The chest pain is described as a pressure, intermittent substernal discomfort that is nonradiating in nature.  The patient admits to occasional episodes of diaphoresis.  The chest pain is described as being 5 out of 10 in intensity.  The chest pain can occur at any time.  The chest pain is worse at night and early morning.  The chest pain is worse with stress.  The chest pain is unrelated to meals and passing gas.  The chest pain has never awakened the patient from sleep.  The patient complains of constipation but denies any diarrhea.  The patient has 1 bowel movement every 4 to 5 days.  The patient complains of a change in bowel habits.  The patient complains of a change in caliber of stool. The patient denies having mucus discharge with the bowel movements.  The patient denies any bright red blood per rectum, melena.  The patient had a CAT scan of the abdomen and pelvis with IV contrast performed on June 24, 2020 to assess the symptoms. The CAT scan revealed findings concerning for right acute pyelonephritis, pyeloureteritis and cystitis. There were no renal stone or hydronephrosis noted. There was reactive retroperitoneal lymph nodes. There was no evidence of malignancy within the abdomen and pelvis.  The blood work performed on Tamika 10, 2020 revealed anemia with a hemoglobin/hematocrit level of 11.1/37.5, respectively, an elevated ferritin level of 791 ng/ml, an elevated ESR of 120 mm/hr, an elevated creatinine level of 1.32 mg/dL, an elevated blood glucose of 111 mg/dl, an elevated ESR of 120 mm/hr, elevated liver enzymes with an alkaline phosphatase/AST/ALT/GGTP of 360/41/63/184 U/L, respectively an elevated platelet count of 438,000, a low iron level of 42 mcg/dL, a low TIBC of 2 11 mcg/dL and an indeterminate hepatitis B surface antibody. The blood tests for COVID 19 IgG antibody was negative. The stool guaiac was negative for occult blood.  The patient admits to having a prior ?colonoscopy performed by another gastroenterologist over 10 years ago. According to the patient, the colonoscopic findings were unknown to the patient. The patient admits to a family history of GI problems. The patient’s sister had a history of liver cancer.

## 2020-07-01 NOTE — ASSESSMENT
[FreeTextEntry1] : Abdominal Pain: The patient complains of abdominal pain. The patient is to avoid nonsteroidal anti-inflammatory drugs and aspirin. I recommend a trial of Pantoprazole 40 mg once a day for 3 months for the symptoms.  \par Dyspepsia: The patient complains of dyspeptic symptoms.  The patient was advised to abide by an anti-gas diet.  The patient was given a pamphlet for anti-gas.  The patient and I reviewed the anti-gas diet at length. The patient is to start on a trial of Phazyme one tablet 3 times a day p.r.n. abdominal pain and gas.\par GERD: The patient was advised to avoid late-night meals and dietary indiscretions.  The patient was advised to avoid fried and fatty foods.  The patient was advised to abide by an anti-GERD diet. The patient was given a pamphlet for anti-GERD.  The patient and I reviewed the anti-GERD diet at length. I recommend a trial of Pantoprazole 40 mg once a day x 3 months for the symptoms.\par Nausea: The patient complains of nausea. If the symptoms of nausea persists, the patient may require a trial of Zofran 4 mg twice a day. \par Constipation: The patient complains of constipation. I recommend a high-fiber diet. I recommend a trial of a probiotic such as Align once a day. I recommend a trial of Metamucil once a day for fiber supplementation. I recommend a trial of Miralax 1 packet once a day for the constipation. .  The patient agreed and will followup to reassess the symptoms.  \par Atypical Chest Pain: The patient complains of atypical chest pain of unclear etiology.  The patient was advised to follow up with the PMD and cardiologist regarding evaluation for the atypical chest pain. The patient was told of possible etiologies such as cardiac, pulmonary, GI, musculoskeletal, stress and other causes for the atypical chest pain.  The patient agrees and will follow-up with the PMD and cardiologist. \par Abnormal Imaging Study: The patient had a CAT scan of the abdomen and pelvis with IV contrast performed on June 24, 2020 to assess the symptoms. The CAT scan revealed findings concerning for right acute pyelonephritis, pyeloureteritis and cystitis. There were no renal stone or hydronephrosis noted. There was reactive retroperitoneal lymph nodes. There was no evidence of malignancy within the abdomen and pelvis.  I recommend followup with urologist for the abnormal CAT scan. The patient is to see the urologist on Monday, July 6, 2020.  I recommend sending a urinalysis and urine culture.\par Elevated Liver Enzymes: The patient has elevated liver enzymes noted on prior blood work. The patient denies any jaundice or pruritus.  The patient denies any alcohol use.  The patient denies taking large doses of nonsteroidal anti-inflammatory drugs or acetaminophen.  I had a long discussion with the patient regarding the elevated liver enzymes and the possible progression of the liver disease to cirrhosis.  The patient was told of the possible increased risk of developing liver failure, cirrhosis, ascites, GI bleeding secondary to varices, hepatic encephalopathy, bleeding tendencies and liver cancer.  The patient was told of the importance of follow-up.  The patient was advised to follow up every 6 months for blood work and imaging studies.  I recommend avoid alcohol and hepato-toxic agents.  I recommend avoiding large doses of nonsteroidal anti-inflammatory drugs or acetaminophen.  I recommend a CBC, SMA 24, amylase, lipase, ESR, TFTs, ROMAN, rheumatoid factor, celiac sprue panel, IgA, profile for hepatitis A, B, C. ,AFP, alpha 1 anti-trypsin  antibody, ceruloplasmin level, iron, TIBC, ferritin level, AMA, anti smooth muscle antibody and PT/INR/PTT in 6 months.  I recommend an abdominal ultrasound of the liver to assess the liver parenchyma and for liver lesions in 6 months.   If the liver enzymes remain elevated, the patient may require a CT guided liver biopsy to assess the liver parenchyma and for possible treatment.  We had a long discussion regarding the risks and benefits of the procedure.  The patient was told of the risks of bleeding, perforation, infections, emergency surgery and missing lesions.  The patient agreed and will follow-up to reassess the symptoms.\par Gastritis: The patient has a history of gastritis. The patient is to avoid nonsteroidal anti-inflammatory drugs and aspirin. I recommend a trial of pantoprazole 40 mg once a day for 3 months for the symptoms.\par H. pylori Gastritis: The patient was found to have H. pylori gastritis on recent upper endoscopy.  I recommend a trial of Clarithromycin 500 mg 2 times a day, Amoxicilin 500mg twice a day and Omeprazole 40 mg twice a day for 14 days for H. pylori eradication after urological evaluation in the event that the patient needs antibiotics for urological disorder.  The patient is to return in 1 month for H. pylori breath test to assess for eradication of the bacteria after treatment. \par Colonoscopy: I recommend a colonoscopy to assess the symptoms. The patient was told of the risks and benefits of the procedure. The patient was told of the risks of perforation, emergency surgery, bleeding, infections and missed lesions. The patient agreed and will schedule for the procedure. The patient can take the antihypertensive medication with a sip of water one hour prior to the procedure. The patient is to hold the diabetic medication the day before and the morning of the procedure. The patient is to be n.p.o. after midnight and bowel prep was given. The patient is to return for the procedure after the urological evaluation. \par Blood Work: I recommend blood work to assess the patient's symptoms. I recommend repeat CBC, SMA 24, amylase, lipase, ESR, iron, TIBC, ferritin level.  I recommend a urinalysis and urine culture.\par Follow-up: The patient is to follow-up in the office in 4 weeks to reassess the symptoms. The patient was told to call the office if any further problems. \par \par \par \par \par \par \par

## 2020-07-02 LAB
ALBUMIN SERPL ELPH-MCNC: 4.3 G/DL
ALP BLD-CCNC: 208 U/L
ALT SERPL-CCNC: 27 U/L
AMYLASE/CREAT SERPL: 77 U/L
ANION GAP SERPL CALC-SCNC: 16 MMOL/L
APPEARANCE: ABNORMAL
AST SERPL-CCNC: 17 U/L
BASOPHILS # BLD AUTO: 0.03 K/UL
BASOPHILS NFR BLD AUTO: 0.5 %
BILIRUB SERPL-MCNC: 0.2 MG/DL
BILIRUBIN URINE: NEGATIVE
BLOOD URINE: ABNORMAL
BUN SERPL-MCNC: 21 MG/DL
CALCIUM SERPL-MCNC: 10.1 MG/DL
CHLORIDE SERPL-SCNC: 101 MMOL/L
CO2 SERPL-SCNC: 24 MMOL/L
COLOR: YELLOW
CREAT SERPL-MCNC: 1.1 MG/DL
EOSINOPHIL # BLD AUTO: 0.24 K/UL
EOSINOPHIL NFR BLD AUTO: 3.7 %
ERYTHROCYTE [SEDIMENTATION RATE] IN BLOOD BY WESTERGREN METHOD: 120 MM/HR
FERRITIN SERPL-MCNC: 509 NG/ML
GGT SERPL-CCNC: 108 U/L
GLUCOSE QUALITATIVE U: NEGATIVE
GLUCOSE SERPL-MCNC: 166 MG/DL
HCT VFR BLD CALC: 34.1 %
HGB BLD-MCNC: 10.2 G/DL
IMM GRANULOCYTES NFR BLD AUTO: 0.3 %
IRON SATN MFR SERPL: 13 %
IRON SERPL-MCNC: 28 UG/DL
KETONES URINE: NEGATIVE
LEUKOCYTE ESTERASE URINE: ABNORMAL
LPL SERPL-CCNC: 50 U/L
LYMPHOCYTES # BLD AUTO: 1.71 K/UL
LYMPHOCYTES NFR BLD AUTO: 26.6 %
MAN DIFF?: NORMAL
MCHC RBC-ENTMCNC: 28 PG
MCHC RBC-ENTMCNC: 29.9 GM/DL
MCV RBC AUTO: 93.7 FL
MONOCYTES # BLD AUTO: 0.47 K/UL
MONOCYTES NFR BLD AUTO: 7.3 %
NEUTROPHILS # BLD AUTO: 3.97 K/UL
NEUTROPHILS NFR BLD AUTO: 61.6 %
NITRITE URINE: POSITIVE
PH URINE: 6
PLATELET # BLD AUTO: 405 K/UL
POTASSIUM SERPL-SCNC: 4.7 MMOL/L
PROT SERPL-MCNC: 8 G/DL
PROTEIN URINE: ABNORMAL
RBC # BLD: 3.64 M/UL
RBC # FLD: 15 %
SODIUM SERPL-SCNC: 141 MMOL/L
SPECIFIC GRAVITY URINE: 1.02
TIBC SERPL-MCNC: 214 UG/DL
UIBC SERPL-MCNC: 186 UG/DL
UROBILINOGEN URINE: NORMAL
WBC # FLD AUTO: 6.44 K/UL

## 2020-07-06 ENCOUNTER — APPOINTMENT (OUTPATIENT)
Dept: GASTROENTEROLOGY | Facility: CLINIC | Age: 50
End: 2020-07-06

## 2020-07-06 LAB — BACTERIA UR CULT: ABNORMAL
